# Patient Record
Sex: MALE | Race: WHITE | Employment: FULL TIME | URBAN - METROPOLITAN AREA
[De-identification: names, ages, dates, MRNs, and addresses within clinical notes are randomized per-mention and may not be internally consistent; named-entity substitution may affect disease eponyms.]

---

## 2020-03-19 PROBLEM — M51.26 HNP (HERNIATED NUCLEUS PULPOSUS), LUMBAR: Status: ACTIVE | Noted: 2020-03-19

## 2020-03-20 ENCOUNTER — HOSPITAL ENCOUNTER (OUTPATIENT)
Dept: PREADMISSION TESTING | Age: 60
Discharge: HOME | End: 2020-03-20
Payer: OTHER GOVERNMENT

## 2020-03-20 DIAGNOSIS — Z01.818 PREOPERATIVE EXAMINATION, UNSPECIFIED: ICD-10-CM

## 2020-03-20 LAB
ABO + RH BLD: NORMAL
ALBUMIN SERPL-MCNC: 3.7 G/DL (ref 3.4–5)
ALBUMIN/GLOB SERPL: 1.2 {RATIO} (ref 0.8–1.7)
ALP SERPL-CCNC: 76 U/L (ref 45–117)
ALT SERPL-CCNC: 53 U/L (ref 16–61)
ANION GAP SERPL CALC-SCNC: 2 MMOL/L (ref 3–18)
AST SERPL-CCNC: 22 U/L (ref 10–38)
BILIRUB SERPL-MCNC: 1.5 MG/DL (ref 0.2–1)
BLOOD GROUP ANTIBODIES SERPL: NORMAL
BUN SERPL-MCNC: 23 MG/DL (ref 7–18)
BUN/CREAT SERPL: 21 (ref 12–20)
CALCIUM SERPL-MCNC: 8.8 MG/DL (ref 8.5–10.1)
CHLORIDE SERPL-SCNC: 107 MMOL/L (ref 100–111)
CO2 SERPL-SCNC: 34 MMOL/L (ref 21–32)
CREAT SERPL-MCNC: 1.09 MG/DL (ref 0.6–1.3)
ERYTHROCYTE [DISTWIDTH] IN BLOOD BY AUTOMATED COUNT: 12.1 % (ref 11.6–14.5)
GLOBULIN SER CALC-MCNC: 3 G/DL (ref 2–4)
GLUCOSE SERPL-MCNC: 112 MG/DL (ref 74–99)
HCT VFR BLD AUTO: 46.9 % (ref 36–48)
HGB BLD-MCNC: 15.8 G/DL (ref 13–16)
MCH RBC QN AUTO: 30.2 PG (ref 24–34)
MCHC RBC AUTO-ENTMCNC: 33.7 G/DL (ref 31–37)
MCV RBC AUTO: 89.5 FL (ref 74–97)
PLATELET # BLD AUTO: 190 K/UL (ref 135–420)
PMV BLD AUTO: 9.5 FL (ref 9.2–11.8)
POTASSIUM SERPL-SCNC: 4.1 MMOL/L (ref 3.5–5.5)
PROT SERPL-MCNC: 6.7 G/DL (ref 6.4–8.2)
RBC # BLD AUTO: 5.24 M/UL (ref 4.7–5.5)
SODIUM SERPL-SCNC: 143 MMOL/L (ref 136–145)
SPECIMEN EXP DATE BLD: NORMAL
WBC # BLD AUTO: 9 K/UL (ref 4.6–13.2)

## 2020-03-20 PROCEDURE — 93005 ELECTROCARDIOGRAM TRACING: CPT

## 2020-03-20 PROCEDURE — 85027 COMPLETE CBC AUTOMATED: CPT

## 2020-03-20 PROCEDURE — 80053 COMPREHEN METABOLIC PANEL: CPT

## 2020-03-20 PROCEDURE — 86900 BLOOD TYPING SEROLOGIC ABO: CPT

## 2020-03-20 PROCEDURE — 36415 COLL VENOUS BLD VENIPUNCTURE: CPT

## 2020-03-20 NOTE — H&P (VIEW-ONLY)
Patient Name:  Constantino Jimenez YOB: 1960 Chief Complaint:  Left-sided L4-5 disc herniation, radiculopathy, and weakness. History of Chief Complaint:  Mr. Christiano Mart is a 70-year-old gentleman who is being seen for left-sided L4-5 disc herniation, radiculopathy, and weakness. He has had lower back pain, left-sided hip pain, and left lower extremity pain that goes into the foot. His pain is mainly in the left leg. He has had these symptoms for two months. He says in October 2019 he moved a 1000 pound rock. His symptoms began worse on 02/28/20. He was seen at [facility] and was given steroids. He says he cannot eat his meals sitting up. The pain is constant. He has numbness in the left foot and feels weakness in the left lower extremity. There is no bowel or bladder dysfunction. He does have some problems with his balance. He can only walk around 800 steps a day. He stands up to brush his teeth only. The pain is worse with walking, standing, and sitting. It is better with lying down. He spends 23 and a half hours a day in bed. The patient has had no chiropractic treatment and no physical therapy. He had an epidural steroid injection done on 03/06/20 which gave him only slight relief. He takes hydrocodone three times a day. He had an MRI scan done at [facility] on 03/02/20. Past Medical/Surgical History:   
Disease/Disorder Date Side Surgery Date Side Comment Hypertension Bunionectomy 1993 left Fractured finger repair, ring finger 1980 left Jaw surgery 1986 Allergies:  No known allergies. Ingredient Reaction Medication Name Comment NO KNOWN ALLERGIES Current Medications:   
Medication Directions  
aspirin 81 mg tablet,delayed release   
diazepam 5 mg tablet   
hydrocodone 5 mg-acetaminophen 325 mg tablet   
lisinopril 10 mg tablet   
meloxicam 7.5 mg tablet Narcan 4 mg/actuation nasal spray   
rosuvastatin 5 mg tablet tadalafil 20 mg tablet Toprol XL 25 mg tablet,extended release   
tramadol 50 mg tablet Vitamin C 1,000 mg tablet Vitamin D3 1,000 unit tablet Social History: He works at [employer] as an . SMOKING Status Tobacco Type Units Per Day Yrs Used Never smoker ALCOHOL There is a history of alcohol use. Type: Beer and wine. 2 glasses consumed weekly. Family History:   
Disease Detail Family Member Age Cause of Death Comments No relevant family history Review of Systems:    Pertinent positives include ringing in ears and numbness/tingling. Pertinent negatives include blurred vision, chest pain, chills, cold, discharge of the eyes, dizziness, double vision, fever, headache, hearing loss, heart murmur, itching of the eyes, palpitations, redness of the eyes, rheumatic fever, sore throat/hoarseness, weight change, abdominal pain, anxiety, bipolar disorder, bladder/kidney infection, bloody stool, blood in urine, burning sensation, changes in mood, chronic cough, depression, diarrhea, difficulty breathing, difficulty swallowing, fainting, frequent urinating, fracture/dislocation, gas/bloating, gout, hemorrhoids, incontinence, joint pain, joint stiffness, loss of balance, memory loss, muscle weakness, nausea/vomiting, pain on breathing, painful urination, psoriasis, rash/itching, Raynaud's phenomenon, rheumatoid disease, seizure disorder, shortness of breath, sprain/strain, swelling of feet, tendonitis, varicose veins and wheezing. Vitals: 
Date BP Pulse Temp (F) Resp. (per min.) Height (Total in.) Weight (lbs.) BMI  
03/19/2020     73.00 235.00 31.00 Physical Examination:   
General:  The patient appears healthy. Cardiovascular:  Arterial pulses are normal. 
Skin:  The skin is normal appearing with no contusions or wounds noted. Heart- RRR Lungs-CTA danielle Abdomen- +BS,soft,nontender Musculoskeletal:  The thoracolumbar spine has normal kyphosis, a normal appearance, and no scoliosis. There is full range of motion of the cervical, thoracic, and lumbar spine and of the hips, knees, and ankles. He has positive straight leg raising on the left. Neurological:  He has 4/5 left EHL. There is no weakness in the thoracic, lumbar, or sacral spine. Deep tendon reflexes are present and normal bilaterally. Ankle and knee jerks are normal with no clonus. Radiograph Examination:  Flexion and extension views of the lumbar spine were obtained and interpreted in the office 3/19/20 and reveal mild degenerative disc disease at L5-S1. Review of his MRI scan of the lumbar spine done at [address] [place] [address] 03/02/20  reveals a large left-sided L5-S1 disc herniation. Impression:   Mr. Elayne Bill has a large left-sided L5-S1 disc herniation with left lower extremity radiculopathy. Plan: We discussed treatments for the condition including surgical intervention, the risks, and benefits as well as the different surgical approaches and decision making. We also discussed nonsurgical care for this condition including medications, injections, physical therapy, rehabilitation, activity modification, and brace utilization. At this point, given the neurogenic changes and weakness with left lower extremity pain, having failed conservative care, he would like to proceed with operative intervention. We will plan for left-sided L4-5 microdiscectomy. The risks versus the benefits as well as the alternatives were fully explained to the patient.   Risks include, but are not limited to, paralysis, death, heart attack, stroke, pulmonary embolism, deep vein thrombosis, infection, failure to relieve pain, increase in back or leg pain, reherniation of disc material, need for revision surgery, scarring, spinal fluid leak, bowel or bladder dysfunction, disease transmission, chronic graft site pain, instrumentation failure, pseudarthrosis, and the need for chronic ambulatory assist devices. The patient states full understanding of the risks and benefits and wishes to proceed.

## 2020-03-21 LAB
ATRIAL RATE: 55 BPM
CALCULATED P AXIS, ECG09: 42 DEGREES
CALCULATED R AXIS, ECG10: 59 DEGREES
CALCULATED T AXIS, ECG11: 58 DEGREES
DIAGNOSIS, 93000: NORMAL
P-R INTERVAL, ECG05: 196 MS
Q-T INTERVAL, ECG07: 432 MS
QRS DURATION, ECG06: 102 MS
QTC CALCULATION (BEZET), ECG08: 413 MS
VENTRICULAR RATE, ECG03: 55 BPM

## 2020-03-22 LAB
BACTERIA SPEC CULT: NORMAL
BACTERIA SPEC CULT: NORMAL
SERVICE CMNT-IMP: NORMAL

## 2020-03-24 ENCOUNTER — ANESTHESIA EVENT (OUTPATIENT)
Dept: SURGERY | Age: 60
End: 2020-03-24
Payer: OTHER GOVERNMENT

## 2020-03-25 ENCOUNTER — APPOINTMENT (OUTPATIENT)
Dept: GENERAL RADIOLOGY | Age: 60
End: 2020-03-25
Attending: ORTHOPAEDIC SURGERY
Payer: OTHER GOVERNMENT

## 2020-03-25 ENCOUNTER — ANESTHESIA (OUTPATIENT)
Dept: SURGERY | Age: 60
End: 2020-03-25
Payer: OTHER GOVERNMENT

## 2020-03-25 ENCOUNTER — HOSPITAL ENCOUNTER (OUTPATIENT)
Age: 60
Setting detail: OUTPATIENT SURGERY
Discharge: HOME | End: 2020-03-25
Attending: ORTHOPAEDIC SURGERY | Admitting: ORTHOPAEDIC SURGERY
Payer: OTHER GOVERNMENT

## 2020-03-25 VITALS
WEIGHT: 231.19 LBS | TEMPERATURE: 97.4 F | HEIGHT: 73 IN | BODY MASS INDEX: 30.64 KG/M2 | SYSTOLIC BLOOD PRESSURE: 152 MMHG | DIASTOLIC BLOOD PRESSURE: 89 MMHG | HEART RATE: 61 BPM | OXYGEN SATURATION: 96 % | RESPIRATION RATE: 16 BRPM

## 2020-03-25 DIAGNOSIS — M51.26 HNP (HERNIATED NUCLEUS PULPOSUS), LUMBAR: Primary | ICD-10-CM

## 2020-03-25 PROCEDURE — 77030008477 HC STYL SATN SLP COVD -A: Performed by: ANESTHESIOLOGY

## 2020-03-25 PROCEDURE — 76210000006 HC OR PH I REC 0.5 TO 1 HR: Performed by: ORTHOPAEDIC SURGERY

## 2020-03-25 PROCEDURE — 77030003666 HC NDL SPINAL BD -A: Performed by: ORTHOPAEDIC SURGERY

## 2020-03-25 PROCEDURE — 77030031139 HC SUT VCRL2 J&J -A: Performed by: ORTHOPAEDIC SURGERY

## 2020-03-25 PROCEDURE — 74011250637 HC RX REV CODE- 250/637: Performed by: ANESTHESIOLOGY

## 2020-03-25 PROCEDURE — 76210000020 HC REC RM PH II FIRST 0.5 HR: Performed by: ORTHOPAEDIC SURGERY

## 2020-03-25 PROCEDURE — 74011000250 HC RX REV CODE- 250: Performed by: NURSE ANESTHETIST, CERTIFIED REGISTERED

## 2020-03-25 PROCEDURE — 77030040361 HC SLV COMPR DVT MDII -B: Performed by: ORTHOPAEDIC SURGERY

## 2020-03-25 PROCEDURE — 77030008462 HC STPLR SKN PROX J&J -A: Performed by: ORTHOPAEDIC SURGERY

## 2020-03-25 PROCEDURE — 76060000033 HC ANESTHESIA 1 TO 1.5 HR: Performed by: ORTHOPAEDIC SURGERY

## 2020-03-25 PROCEDURE — 74011000272 HC RX REV CODE- 272: Performed by: ORTHOPAEDIC SURGERY

## 2020-03-25 PROCEDURE — 77030027521 HC SEAL BPLR AQMNTYS EVS MEDT -F: Performed by: ORTHOPAEDIC SURGERY

## 2020-03-25 PROCEDURE — 74011250636 HC RX REV CODE- 250/636: Performed by: ORTHOPAEDIC SURGERY

## 2020-03-25 PROCEDURE — 77030018836 HC SOL IRR NACL ICUM -A: Performed by: ORTHOPAEDIC SURGERY

## 2020-03-25 PROCEDURE — 77030003029 HC SUT VCRL J&J -B: Performed by: ORTHOPAEDIC SURGERY

## 2020-03-25 PROCEDURE — 74011000250 HC RX REV CODE- 250: Performed by: ORTHOPAEDIC SURGERY

## 2020-03-25 PROCEDURE — 74011250636 HC RX REV CODE- 250/636: Performed by: ANESTHESIOLOGY

## 2020-03-25 PROCEDURE — 77030018673: Performed by: ORTHOPAEDIC SURGERY

## 2020-03-25 PROCEDURE — 77030008683 HC TU ET CUF COVD -A: Performed by: ANESTHESIOLOGY

## 2020-03-25 PROCEDURE — 76010000149 HC OR TIME 1 TO 1.5 HR: Performed by: ORTHOPAEDIC SURGERY

## 2020-03-25 PROCEDURE — 77030010507 HC ADH SKN DERMBND J&J -B: Performed by: ORTHOPAEDIC SURGERY

## 2020-03-25 PROCEDURE — 77030020782 HC GWN BAIR PAWS FLX 3M -B: Performed by: ORTHOPAEDIC SURGERY

## 2020-03-25 PROCEDURE — 74011250636 HC RX REV CODE- 250/636: Performed by: NURSE ANESTHETIST, CERTIFIED REGISTERED

## 2020-03-25 PROCEDURE — 77030006643: Performed by: ANESTHESIOLOGY

## 2020-03-25 PROCEDURE — 77030002986 HC SUT PROL J&J -A: Performed by: ORTHOPAEDIC SURGERY

## 2020-03-25 RX ORDER — SODIUM CHLORIDE, SODIUM LACTATE, POTASSIUM CHLORIDE, CALCIUM CHLORIDE 600; 310; 30; 20 MG/100ML; MG/100ML; MG/100ML; MG/100ML
125 INJECTION, SOLUTION INTRAVENOUS CONTINUOUS
Status: DISCONTINUED | OUTPATIENT
Start: 2020-03-25 | End: 2020-03-25 | Stop reason: HOSPADM

## 2020-03-25 RX ORDER — FENTANYL CITRATE 50 UG/ML
25 INJECTION, SOLUTION INTRAMUSCULAR; INTRAVENOUS
Status: DISCONTINUED | OUTPATIENT
Start: 2020-03-25 | End: 2020-03-25 | Stop reason: HOSPADM

## 2020-03-25 RX ORDER — CYCLOBENZAPRINE HCL 10 MG
5-10 TABLET ORAL
Qty: 60 TAB | Refills: 0 | Status: SHIPPED | OUTPATIENT
Start: 2020-03-25 | End: 2020-04-14

## 2020-03-25 RX ORDER — ROCURONIUM BROMIDE 10 MG/ML
INJECTION, SOLUTION INTRAVENOUS AS NEEDED
Status: DISCONTINUED | OUTPATIENT
Start: 2020-03-25 | End: 2020-03-25 | Stop reason: HOSPADM

## 2020-03-25 RX ORDER — MIDAZOLAM HYDROCHLORIDE 1 MG/ML
INJECTION, SOLUTION INTRAMUSCULAR; INTRAVENOUS AS NEEDED
Status: DISCONTINUED | OUTPATIENT
Start: 2020-03-25 | End: 2020-03-25 | Stop reason: HOSPADM

## 2020-03-25 RX ORDER — ONDANSETRON 2 MG/ML
4 INJECTION INTRAMUSCULAR; INTRAVENOUS ONCE
Status: DISCONTINUED | OUTPATIENT
Start: 2020-03-25 | End: 2020-03-25 | Stop reason: HOSPADM

## 2020-03-25 RX ORDER — FENTANYL CITRATE 50 UG/ML
INJECTION, SOLUTION INTRAMUSCULAR; INTRAVENOUS AS NEEDED
Status: DISCONTINUED | OUTPATIENT
Start: 2020-03-25 | End: 2020-03-25 | Stop reason: HOSPADM

## 2020-03-25 RX ORDER — SODIUM CHLORIDE, SODIUM LACTATE, POTASSIUM CHLORIDE, CALCIUM CHLORIDE 600; 310; 30; 20 MG/100ML; MG/100ML; MG/100ML; MG/100ML
150 INJECTION, SOLUTION INTRAVENOUS CONTINUOUS
Status: DISCONTINUED | OUTPATIENT
Start: 2020-03-25 | End: 2020-03-25 | Stop reason: HOSPADM

## 2020-03-25 RX ORDER — DEXTROSE MONOHYDRATE 100 MG/ML
125-250 INJECTION, SOLUTION INTRAVENOUS AS NEEDED
Status: DISCONTINUED | OUTPATIENT
Start: 2020-03-25 | End: 2020-03-25 | Stop reason: HOSPADM

## 2020-03-25 RX ORDER — ONDANSETRON 2 MG/ML
INJECTION INTRAMUSCULAR; INTRAVENOUS AS NEEDED
Status: DISCONTINUED | OUTPATIENT
Start: 2020-03-25 | End: 2020-03-25 | Stop reason: HOSPADM

## 2020-03-25 RX ORDER — NEOSTIGMINE METHYLSULFATE 1 MG/ML
INJECTION, SOLUTION INTRAVENOUS AS NEEDED
Status: DISCONTINUED | OUTPATIENT
Start: 2020-03-25 | End: 2020-03-25 | Stop reason: HOSPADM

## 2020-03-25 RX ORDER — MAGNESIUM SULFATE 100 %
4 CRYSTALS MISCELLANEOUS AS NEEDED
Status: DISCONTINUED | OUTPATIENT
Start: 2020-03-25 | End: 2020-03-25 | Stop reason: HOSPADM

## 2020-03-25 RX ORDER — SODIUM CHLORIDE 0.9 % (FLUSH) 0.9 %
5-40 SYRINGE (ML) INJECTION AS NEEDED
Status: DISCONTINUED | OUTPATIENT
Start: 2020-03-25 | End: 2020-03-25 | Stop reason: HOSPADM

## 2020-03-25 RX ORDER — LIDOCAINE HYDROCHLORIDE 20 MG/ML
INJECTION, SOLUTION EPIDURAL; INFILTRATION; INTRACAUDAL; PERINEURAL AS NEEDED
Status: DISCONTINUED | OUTPATIENT
Start: 2020-03-25 | End: 2020-03-25 | Stop reason: HOSPADM

## 2020-03-25 RX ORDER — HYDROCODONE BITARTRATE AND ACETAMINOPHEN 5; 325 MG/1; MG/1
1 TABLET ORAL AS NEEDED
Status: DISCONTINUED | OUTPATIENT
Start: 2020-03-25 | End: 2020-03-25 | Stop reason: HOSPADM

## 2020-03-25 RX ORDER — SODIUM CHLORIDE 0.9 % (FLUSH) 0.9 %
5-40 SYRINGE (ML) INJECTION EVERY 8 HOURS
Status: DISCONTINUED | OUTPATIENT
Start: 2020-03-25 | End: 2020-03-25 | Stop reason: HOSPADM

## 2020-03-25 RX ORDER — BUPIVACAINE HYDROCHLORIDE AND EPINEPHRINE 2.5; 5 MG/ML; UG/ML
INJECTION, SOLUTION EPIDURAL; INFILTRATION; INTRACAUDAL; PERINEURAL AS NEEDED
Status: DISCONTINUED | OUTPATIENT
Start: 2020-03-25 | End: 2020-03-25 | Stop reason: HOSPADM

## 2020-03-25 RX ORDER — ALBUTEROL SULFATE 0.83 MG/ML
2.5 SOLUTION RESPIRATORY (INHALATION) AS NEEDED
Status: DISCONTINUED | OUTPATIENT
Start: 2020-03-25 | End: 2020-03-25 | Stop reason: HOSPADM

## 2020-03-25 RX ORDER — INSULIN LISPRO 100 [IU]/ML
INJECTION, SOLUTION INTRAVENOUS; SUBCUTANEOUS ONCE
Status: DISCONTINUED | OUTPATIENT
Start: 2020-03-25 | End: 2020-03-25 | Stop reason: HOSPADM

## 2020-03-25 RX ORDER — PROPOFOL 10 MG/ML
INJECTION, EMULSION INTRAVENOUS AS NEEDED
Status: DISCONTINUED | OUTPATIENT
Start: 2020-03-25 | End: 2020-03-25 | Stop reason: HOSPADM

## 2020-03-25 RX ORDER — NALOXONE HYDROCHLORIDE 4 MG/.1ML
SPRAY NASAL
Qty: 1 EACH | Refills: 0 | Status: SHIPPED | OUTPATIENT
Start: 2020-03-25

## 2020-03-25 RX ORDER — CEFAZOLIN SODIUM 2 G/50ML
2 SOLUTION INTRAVENOUS ONCE
Status: COMPLETED | OUTPATIENT
Start: 2020-03-25 | End: 2020-03-25

## 2020-03-25 RX ORDER — FENTANYL CITRATE 50 UG/ML
INJECTION, SOLUTION INTRAMUSCULAR; INTRAVENOUS
Status: DISCONTINUED
Start: 2020-03-25 | End: 2020-03-25 | Stop reason: HOSPADM

## 2020-03-25 RX ORDER — NALOXONE HYDROCHLORIDE 0.4 MG/ML
0.1 INJECTION, SOLUTION INTRAMUSCULAR; INTRAVENOUS; SUBCUTANEOUS AS NEEDED
Status: DISCONTINUED | OUTPATIENT
Start: 2020-03-25 | End: 2020-03-25 | Stop reason: HOSPADM

## 2020-03-25 RX ORDER — GLYCOPYRROLATE 0.2 MG/ML
INJECTION INTRAMUSCULAR; INTRAVENOUS AS NEEDED
Status: DISCONTINUED | OUTPATIENT
Start: 2020-03-25 | End: 2020-03-25 | Stop reason: HOSPADM

## 2020-03-25 RX ORDER — HYDROMORPHONE HYDROCHLORIDE 2 MG/ML
0.2 INJECTION, SOLUTION INTRAMUSCULAR; INTRAVENOUS; SUBCUTANEOUS AS NEEDED
Status: DISCONTINUED | OUTPATIENT
Start: 2020-03-25 | End: 2020-03-25 | Stop reason: HOSPADM

## 2020-03-25 RX ORDER — OXYCODONE AND ACETAMINOPHEN 5; 325 MG/1; MG/1
1-2 TABLET ORAL
Qty: 84 TAB | Refills: 0 | Status: SHIPPED | OUTPATIENT
Start: 2020-03-25 | End: 2020-04-01

## 2020-03-25 RX ORDER — DIPHENHYDRAMINE HYDROCHLORIDE 50 MG/ML
12.5 INJECTION, SOLUTION INTRAMUSCULAR; INTRAVENOUS
Status: DISCONTINUED | OUTPATIENT
Start: 2020-03-25 | End: 2020-03-25 | Stop reason: HOSPADM

## 2020-03-25 RX ADMIN — Medication 3 MG: at 12:41

## 2020-03-25 RX ADMIN — SODIUM CHLORIDE, SODIUM LACTATE, POTASSIUM CHLORIDE, AND CALCIUM CHLORIDE: 600; 310; 30; 20 INJECTION, SOLUTION INTRAVENOUS at 12:07

## 2020-03-25 RX ADMIN — GLYCOPYRROLATE 0.2 MG: 0.2 INJECTION INTRAMUSCULAR; INTRAVENOUS at 11:40

## 2020-03-25 RX ADMIN — FENTANYL CITRATE 100 MCG: 50 INJECTION, SOLUTION INTRAMUSCULAR; INTRAVENOUS at 11:44

## 2020-03-25 RX ADMIN — FENTANYL CITRATE 25 MCG: 50 INJECTION INTRAMUSCULAR; INTRAVENOUS at 13:41

## 2020-03-25 RX ADMIN — GLYCOPYRROLATE 0.4 MG: 0.2 INJECTION INTRAMUSCULAR; INTRAVENOUS at 12:41

## 2020-03-25 RX ADMIN — SODIUM CHLORIDE, SODIUM LACTATE, POTASSIUM CHLORIDE, AND CALCIUM CHLORIDE 125 ML/HR: 600; 310; 30; 20 INJECTION, SOLUTION INTRAVENOUS at 09:20

## 2020-03-25 RX ADMIN — MIDAZOLAM 2 MG: 1 INJECTION INTRAMUSCULAR; INTRAVENOUS at 11:37

## 2020-03-25 RX ADMIN — HYDROCODONE BITARTRATE AND ACETAMINOPHEN 1 TABLET: 5; 325 TABLET ORAL at 14:18

## 2020-03-25 RX ADMIN — FENTANYL CITRATE 25 MCG: 50 INJECTION INTRAMUSCULAR; INTRAVENOUS at 13:31

## 2020-03-25 RX ADMIN — FENTANYL CITRATE 25 MCG: 50 INJECTION INTRAMUSCULAR; INTRAVENOUS at 13:51

## 2020-03-25 RX ADMIN — CEFAZOLIN SODIUM 2 G: 2 SOLUTION INTRAVENOUS at 11:54

## 2020-03-25 RX ADMIN — FENTANYL CITRATE 25 MCG: 50 INJECTION INTRAMUSCULAR; INTRAVENOUS at 14:01

## 2020-03-25 RX ADMIN — Medication 50 MG: at 11:45

## 2020-03-25 RX ADMIN — LIDOCAINE HYDROCHLORIDE 100 MG: 20 INJECTION, SOLUTION EPIDURAL; INFILTRATION; INTRACAUDAL; PERINEURAL at 11:44

## 2020-03-25 RX ADMIN — PROPOFOL 180 MG: 10 INJECTION, EMULSION INTRAVENOUS at 11:44

## 2020-03-25 RX ADMIN — ONDANSETRON HYDROCHLORIDE 4 MG: 2 INJECTION INTRAMUSCULAR; INTRAVENOUS at 11:40

## 2020-03-25 RX ADMIN — FENTANYL CITRATE 50 MCG: 50 INJECTION, SOLUTION INTRAMUSCULAR; INTRAVENOUS at 12:29

## 2020-03-25 NOTE — ANESTHESIA POSTPROCEDURE EVALUATION
Post-Anesthesia Evaluation and Assessment    Cardiovascular Function/Vital Signs  Visit Vitals  /81   Pulse (!) 59   Temp 36.3 °C (97.4 °F)   Resp 14   Ht 6' 1\" (1.854 m)   Wt 104.9 kg (231 lb 3 oz)   SpO2 99%   BMI 30.50 kg/m²       Patient is status post Procedure(s):  LEFT LUMBAR 4-5 MICRODISKECTOMY WITH C-ARM \"SPEC POP\". Nausea/Vomiting: Controlled. Postoperative hydration reviewed and adequate. Pain:  Pain Scale 1: Numeric (0 - 10) (03/25/20 1358)  Pain Intensity 1: 4 (03/25/20 1358)   Managed. Neurological Status:   Neuro (WDL): Exceptions to WDL (03/25/20 1337)   At baseline. Mental Status and Level of Consciousness: Arousable. Pulmonary Status:   O2 Device: Room air (03/25/20 1358)   Adequate oxygenation and airway patent. Complications related to anesthesia: None    Post-anesthesia assessment completed. No concerns. Patient has met all discharge requirements. Signed By: Brayden Barr CRNA    March 25, 2020           Post-Anesthesia Evaluation and Assessment    Cardiovascular Function/Vital Signs  Visit Vitals  /81   Pulse (!) 59   Temp 36.3 °C (97.4 °F)   Resp 14   Ht 6' 1\" (1.854 m)   Wt 104.9 kg (231 lb 3 oz)   SpO2 99%   BMI 30.50 kg/m²       Patient is status post Procedure(s):  LEFT LUMBAR 4-5 MICRODISKECTOMY WITH C-ARM \"SPEC POP\". Nausea/Vomiting: Controlled. Postoperative hydration reviewed and adequate. Pain:  Pain Scale 1: Numeric (0 - 10) (03/25/20 1358)  Pain Intensity 1: 4 (03/25/20 1358)   Managed. Neurological Status:   Neuro (WDL): Exceptions to WDL (03/25/20 1337)   At baseline. Mental Status and Level of Consciousness: Arousable. Pulmonary Status:   O2 Device: Room air (03/25/20 1358)   Adequate oxygenation and airway patent. Complications related to anesthesia: None    Post-anesthesia assessment completed. No concerns. Patient has met all discharge requirements.     Signed By: Martha Dao MD    March 25, 2020 Procedure(s):  LEFT LUMBAR 4-5 MICRODISKECTOMY WITH C-ARM \"SPEC POP\". general    <BSHSIANPOST>    Vitals Value Taken Time   /79 3/25/2020  2:00 PM   Temp 36.3 °C (97.4 °F) 3/25/2020  1:10 PM   Pulse 59 3/25/2020  2:04 PM   Resp 16 3/25/2020  2:04 PM   SpO2 98 % 3/25/2020  2:04 PM   Vitals shown include unvalidated device data.

## 2020-03-25 NOTE — DISCHARGE INSTRUCTIONS
"  SUBJECTIVE:                                                    Kathie Hidalgo is a 27 year old female who presents to clinic today for the following health issues:        Acute Illness   Acute illness concerns: Sore throat and Ear pain  Onset: 1 day    Fever: no    Chills/Sweats: no    Headache (location?): YES- 3 weeks    Sinus Pressure:YES- 3 weeks    Conjunctivitis:  no    Ear Pain: YES: left    Rhinorrhea: no    Congestion: YES- 2 months    Sore Throat: YES, just tthe left side     Cough: YES-X4 weeks    Wheeze: no    Decreased Appetite: no    Nausea: no    Vomiting: no    Diarrhea:  no    Dysuria/Freq.: no    Fatigue/Achiness: YES    Sick/Strep Exposure: YES-  with similar sx     Therapies Tried and outcome: NA  No insurance - self pay and doesn't want testing today    Problem list and histories reviewed & adjusted, as indicated.  Additional history: as documented    Patient Active Problem List   Diagnosis     Attempting to conceive     Missed ab     Past Surgical History:   Procedure Laterality Date     APPENDECTOMY       FOOT SURGERY         Social History   Substance Use Topics     Smoking status: Never Smoker     Smokeless tobacco: Never Used     Alcohol use Yes      Comment: minimal     Family History   Problem Relation Age of Onset     Other Cancer Father          Current Outpatient Prescriptions   Medication Sig Dispense Refill     amoxicillin (AMOXIL) 875 MG tablet Take 1 tablet (875 mg) by mouth 2 times daily 20 tablet 0     No Known Allergies    ROS:  Constitutional, HEENT, cardiovascular, pulmonary, gi and gu systems are negative, except as otherwise noted.    OBJECTIVE:     /57 (BP Location: Right arm, Patient Position: Chair, Cuff Size: Adult Large)  Pulse 81  Temp 98.5  F (36.9  C) (Oral)  Resp 18  Ht 5' 5.25\" (1.657 m)  Wt 187 lb (84.8 kg)  LMP 02/01/2018  SpO2 98%  BMI 30.88 kg/m2  Body mass index is 30.88 kg/(m^2).  GENERAL: healthy, alert and mild distress  EYES: Eyes " WBAT. Change dressing in 3 days. Do not get incision wet x 5 days. No lifting over 20 pounds. Take stool softeners daily while taking pain medications, since pain medicines are constipating. Resume pre hospital diet  Drink plenty of fluids  Take prescription as directed  Ambulate in house  Follow up with Dr. Edwar Vásquez in 10 days or as scheduled        DISCHARGE SUMMARY from Nurse    PATIENT INSTRUCTIONS:    After general anesthesia or intravenous sedation, for 24 hours or while taking prescription Narcotics:  · Limit your activities  · Do not drive and operate hazardous machinery  · Do not make important personal or business decisions  · Do  not drink alcoholic beverages  · If you have not urinated within 8 hours after discharge, please contact your surgeon on call. Report the following to your surgeon:  · Excessive pain, swelling, redness or odor of or around the surgical area  · Temperature over 100.5  · Nausea and vomiting lasting longer than 4 hours or if unable to take medications  · Any signs of decreased circulation or nerve impairment to extremity: change in color, persistent  numbness, tingling, coldness or increase pain  · Any questions    What to do at Home:  Recommended activity: Ambulate in house, No driving while on analgesics and No heavy lifting until advised    If you experience any of the following symptoms fever, chills, uncontrollable pain , active bleeding or drainage , circulation changes ( cold,numb,blue extremity), nausea, vomiting, difficulty urinating, please follow up with Dr. Edwar Vásquez. *  Please give a list of your current medications to your Primary Care Provider. *  Please update this list whenever your medications are discontinued, doses are      changed, or new medications (including over-the-counter products) are added. *  Please carry medication information at all times in case of emergency situations.     These are general instructions for a healthy grossly normal to inspection, PERRL and conjunctivae and sclerae normal  HENT: ear canals and right TM's normal, left TM injected, nose and mouth without ulcers or lesions, post pharynx inflamed but not edematous, no sinus tenderness   NECK: no tender adenopathy, no asymmetry, masses, or scars and thyroid normal to palpation  RESP: lungs clear to auscultation - no rales, rhonchi or wheezes  CV: regular rate and rhythm, normal S1 S2, no S3 or S4, no murmur, click or rub,    Diagnostic Test Results:  none     ASSESSMENT/PLAN:         ICD-10-CM    1. Acute sinusitis, recurrence not specified, unspecified location J01.90 amoxicillin (AMOXIL) 875 MG tablet   due to duration of illness will treat for bacterial sinusitis     Patient Instructions   Begin taking plain mucinex 600mg twice a day  And use flonase 2 sprays each nostril once a day  Take aleve for pain   Take the antibiotic twice a day for 10 days       ELOISE Meraz CNP  [facility]       lifestyle:    No smoking/ No tobacco products/ Avoid exposure to second hand smoke  Surgeon General's Warning:  Quitting smoking now greatly reduces serious risk to your health. Obesity, smoking, and sedentary lifestyle greatly increases your risk for illness    A healthy diet, regular physical exercise & weight monitoring are important for maintaining a healthy lifestyle    You may be retaining fluid if you have a history of heart failure or if you experience any of the following symptoms:  Weight gain of 3 pounds or more overnight or 5 pounds in a week, increased swelling in our hands or feet or shortness of breath while lying flat in bed. Please call your doctor as soon as you notice any of these symptoms; do not wait until your next office visit. Patient armband removed and shredded    The discharge information has been reviewed with the patient and caregiver. The patient and caregiver verbalized understanding. Discharge medications reviewed with the patient and caregiver and appropriate educational materials and side effects teaching were provided. Patient Education        Learning About Coronavirus Disease (COVID-19)  Coronavirus disease (COVID-19): Overview  What is coronavirus disease (COVID-19)? The coronavirus disease 2019 (COVID-19) is caused by a virus. It is an illness that was first found in [place], [place], in December 2019. It has since spread to other countries. The virus can cause fever, cough, and trouble breathing. In severe cases, it can cause pneumonia and make it hard to breathe without help. It can cause death. Coronaviruses are a large group of viruses. They cause the common cold. They also cause more serious illnesses like Middle East respiratory syndrome (MERS) and severe acute respiratory syndrome (SARS). COVID-19 is caused by a novel coronavirus. That means it's a new type that has not been seen in people before.   This virus spreads person-to-person through droplets from coughing and sneezing. It may also spread by touching something that has the virus on it, such as a doorknob or a tabletop. What can you do to protect yourself from coronavirus disease (COVID-19)? The best way to protect yourself from getting sick is to:  · Avoid areas where there is an outbreak. · Avoid contact with people who may be infected. · Wash your hands often with soap or alcohol-based hand sanitizers. · Avoid touching your mouth, nose, and eyes with unwashed hands. What can you do to avoid spreading the virus to others? To help avoid spreading the virus to others:  · Cover your mouth with a tissue when you cough or sneeze. Then throw the tissue in the trash. · Use a disinfectant to clean things that you touch often. · Stay home if you are sick. If you must go to the doctor's office, call ahead for instructions and wear a face mask. When to call for help  Call 911 anytime you think you may need emergency care. For example, call if:  · You have severe trouble breathing. · You have severe dehydration. Symptoms of dehydration include:  ? Dry eyes and a dry mouth. ? Passing only a little urine. ? Feeling thirstier than usual.  · You are extremely confused or not thinking clearly. · You pass out (lose consciousness). Call your doctor now if you develop symptoms such as:  · Shortness of breath. · Fever. · Cough. If you need to get care, call ahead to the doctor's office for instructions before you go. Make sure you wear a face mask when you go there to prevent exposing other people to the virus. Where can you get the latest information? The following health organizations are tracking and studying this virus. Their websites contain the most up-to-date information. Verna Habermann also learn what to do if you think you may have been exposed to the virus. · U.S. Centers for Disease Control and Prevention (CDC): The CDC provides updated news about the disease.  The website also tells you how to prevent the spread of infection. www.cdc.gov  · World Health Organization [facility]): WHO offers information about the virus outbreaks. WHO also has travel advice. www.who.int  Current as of: March 12, 2020               Content Version: 12.4  © 2006-2020 [employer]. Care instructions adapted under license by your healthcare professional. If you have questions about a medical condition or this instruction, always ask your healthcare professional. [address] any warranty or liability for your use of this information.          ___________________________________________________________________________________________________________________________________

## 2020-03-25 NOTE — PERIOP NOTES
Reviewed PTA medication list with patient/caregiver and patient/caregiver denies any additional medications. Patient admits to having a responsible adult care for them at home for at least 24 hours after surgery. Patient encouraged to use gown warming system and informed that using said warming gown to regulate body temperature prior to a procedure has been shown to help reduce the risks of blood clots and infection.     Dual skin assessment & fall risk band verification completed with Saul Amin RN.

## 2020-03-25 NOTE — OP NOTES
Patient: Jeronimo Bell MRN: [id number]  SSN: [id number]    YOB: 1960  Age: 61 y.o. Sex: male      Date of Procedure: 3/25/2020   Preoperative Diagnosis: LUMBAGO, LUMBAR RADICULOPATHY, HYPERTENSION  Postoperative Diagnosis: LUMBAGO, LUMBAR RADICULOPATHY, HYPERTENSION    Procedure: Procedure(s):  LEFT LUMBAR 4-5 MICRODISKECTOMY WITH C-ARM \"SPEC POP\"  Surgeon(s) and Role:     * Yelitza Colunga MD - Primary  Assistant: Tejas Miller PA-C  OR Assitance: Physician Assistant: LISETH Kaiser  Anesthesia: General   Estimated Blood Loss: 50cc  Specimens: * No specimens in log *   Findings: HNP   Complications: none      Indications: This is a 61y.o. year-old male who presents with significant left lower extremity pain, worsening ability to do activities of daily living. X-rays and MRI scan revealing disc herniation and foraminal stenosis, and degenerative disk disease. Having having failed conservative care now comes for operative intervention. DESCRIPTION OF PROCEDURE: After correct identification, the patient was   taken to the operating room, placed supine on the table, general   endotracheal anesthesia induced. The patient was then rotated onto the Ernst frame and prepped with DuraPrep. 2grams of Ancef was given. Midline incision was made in the lumbar spine, taken down to the spinous processes of L4-5. The posterior lamina of L4-5 was exposed. Noa retractor was then placed. The wound was then irrigated. Laminectomy was then performed of the inferior aspect of L4 as well as the superior aspect of L5. This provided excellent visualization of the dura. The nerve root was then mobilized medially and held with a nerve root retractor. The disc herniation was noted be very large, extending into the axilla of the nerve root and compressing the nerve root.  #15 blade was used to make a cruciate incision in the annulus and large pieces of disc material were removed from behind the annulus, from the axilla, as well as from the disc space itself. Bleeding controlled with bipolar electrocautery. The wound was then irrigated. Fascia was then closed using #1 Vicryl suture. Subcutaneous tissue   approximated with 2-0 Vicryl suture. Skin approximated with 3-0 PDS suture and dermabond was appled. Sterile dressing was applied. The patient tolerated the procedure well and taken to Recovery room in good   condition. Assistant surgeon was needed throughout the procedure for managing bleeding, improving visualization and closure of the surgical wounds.

## 2020-03-25 NOTE — INTERVAL H&P NOTE
Update History & Physical 
 
The Patient's History and Physical  was reviewed with the patient and I examined the patient. There was no change. The surgical site was confirmed by the patient and me. Plan:  The risk, benefits, expected outcome, and alternative to the recommended procedure have been discussed with the patient. Patient understands and wants to proceed with the procedure.  
 
Electronically signed by Asiya Sands MD on 3/25/2020 at 8:23 AM

## 2020-03-25 NOTE — ANESTHESIA PREPROCEDURE EVALUATION
Relevant Problems   No relevant active problems       Anesthetic History   No history of anesthetic complications            Review of Systems / Medical History  Patient summary reviewed, nursing notes reviewed and pertinent labs reviewed    Pulmonary  Within defined limits                 Neuro/Psych   Within defined limits           Cardiovascular    Hypertension              Exercise tolerance: >4 METS     GI/Hepatic/Renal  Within defined limits              Endo/Other  Within defined limits           Other Findings              Physical Exam    Airway  Mallampati: II  TM Distance: 4 - 6 cm  Neck ROM: normal range of motion   Mouth opening: Normal     Cardiovascular  Regular rate and rhythm,  S1 and S2 normal,  no murmur, click, rub, or gallop             Dental  No notable dental hx       Pulmonary  Breath sounds clear to auscultation               Abdominal  GI exam deferred       Other Findings            Anesthetic Plan    ASA: 2  Anesthesia type: general          Induction: Intravenous  Anesthetic plan and risks discussed with: Patient

## 2021-01-26 ENCOUNTER — HOSPITAL ENCOUNTER (OUTPATIENT)
Dept: PREADMISSION TESTING | Age: 61
Discharge: HOME | End: 2021-01-26
Payer: OTHER GOVERNMENT

## 2021-01-26 LAB
ALBUMIN SERPL-MCNC: 4 G/DL (ref 3.4–5)
ALBUMIN/GLOB SERPL: 1.3 {RATIO} (ref 0.8–1.7)
ALP SERPL-CCNC: 82 U/L (ref 45–117)
ALT SERPL-CCNC: 57 U/L (ref 16–61)
ANION GAP SERPL CALC-SCNC: 1 MMOL/L (ref 3–18)
AST SERPL-CCNC: 31 U/L (ref 10–38)
ATRIAL RATE: 57 BPM
BILIRUB SERPL-MCNC: 1.1 MG/DL (ref 0.2–1)
BUN SERPL-MCNC: 22 MG/DL (ref 7–18)
BUN/CREAT SERPL: 18 (ref 12–20)
CALCIUM SERPL-MCNC: 9.1 MG/DL (ref 8.5–10.1)
CALCULATED P AXIS, ECG09: 34 DEGREES
CALCULATED R AXIS, ECG10: 31 DEGREES
CALCULATED T AXIS, ECG11: 39 DEGREES
CHLORIDE SERPL-SCNC: 109 MMOL/L (ref 100–111)
CO2 SERPL-SCNC: 32 MMOL/L (ref 21–32)
CREAT SERPL-MCNC: 1.21 MG/DL (ref 0.6–1.3)
DIAGNOSIS, 93000: NORMAL
ERYTHROCYTE [DISTWIDTH] IN BLOOD BY AUTOMATED COUNT: 12.2 % (ref 11.6–14.5)
GLOBULIN SER CALC-MCNC: 3.1 G/DL (ref 2–4)
GLUCOSE SERPL-MCNC: 87 MG/DL (ref 74–99)
HCT VFR BLD AUTO: 46.6 % (ref 36–48)
HGB BLD-MCNC: 15.5 G/DL (ref 13–16)
MCH RBC QN AUTO: 29.6 PG (ref 24–34)
MCHC RBC AUTO-ENTMCNC: 33.3 G/DL (ref 31–37)
MCV RBC AUTO: 88.9 FL (ref 74–97)
P-R INTERVAL, ECG05: 202 MS
PLATELET # BLD AUTO: 209 K/UL (ref 135–420)
PMV BLD AUTO: 9.1 FL (ref 9.2–11.8)
POTASSIUM SERPL-SCNC: 4.8 MMOL/L (ref 3.5–5.5)
PROT SERPL-MCNC: 7.1 G/DL (ref 6.4–8.2)
Q-T INTERVAL, ECG07: 454 MS
QRS DURATION, ECG06: 102 MS
QTC CALCULATION (BEZET), ECG08: 441 MS
RBC # BLD AUTO: 5.24 M/UL (ref 4.7–5.5)
SODIUM SERPL-SCNC: 142 MMOL/L (ref 136–145)
VENTRICULAR RATE, ECG03: 57 BPM
WBC # BLD AUTO: 10 K/UL (ref 4.6–13.2)

## 2021-01-26 PROCEDURE — 85027 COMPLETE CBC AUTOMATED: CPT

## 2021-01-26 PROCEDURE — 93005 ELECTROCARDIOGRAM TRACING: CPT

## 2021-01-26 PROCEDURE — 36415 COLL VENOUS BLD VENIPUNCTURE: CPT

## 2021-01-26 PROCEDURE — 80053 COMPREHEN METABOLIC PANEL: CPT

## 2021-02-03 ENCOUNTER — HOSPITAL ENCOUNTER (OUTPATIENT)
Dept: PREADMISSION TESTING | Age: 61
Discharge: HOME | End: 2021-02-03
Payer: OTHER GOVERNMENT

## 2021-02-03 PROCEDURE — U0003 INFECTIOUS AGENT DETECTION BY NUCLEIC ACID (DNA OR RNA); SEVERE ACUTE RESPIRATORY SYNDROME CORONAVIRUS 2 (SARS-COV-2) (CORONAVIRUS DISEASE [COVID-19]), AMPLIFIED PROBE TECHNIQUE, MAKING USE OF HIGH THROUGHPUT TECHNOLOGIES AS DESCRIBED BY CMS-2020-01-R: HCPCS

## 2021-02-04 LAB — SARS-COV-2, COV2NT: NOT DETECTED

## 2021-02-08 ENCOUNTER — ANESTHESIA EVENT (OUTPATIENT)
Dept: SURGERY | Age: 61
End: 2021-02-08
Payer: OTHER GOVERNMENT

## 2021-02-08 NOTE — H&P
Patient Name:  Uzair Quintana  Account #:  [de-identified]  YOB: 1960    Chief Complaint:  Followup left foot. History of Chief Complaint:  Mr. Cali Johnson follows up. He has upcoming left-sided foot surgery in February. He is here for preoperative evaluation to make sure nothing has changed. He does note he has a new rash on his right hip for which he is being treated with a topical cream.  He is wearing his modified shoes. His foot still hurts. He still has some numbness from his back surgery from March of 2020. The left foot still hurts mostly at the 2nd and 3rd toe. Past Medical/Surgical History:    Disease/Disorder Date Side Surgery Date Side Comment   Hypertension            Bunionectomy 1993 left       Discectomy, lumbar 03/25/2020  DL 04/08/2020 -L4-5      Fractured finger repair, ring finger 1980 left       Jaw surgery 1986       Allergies:  No known allergies. Ingredient Reaction Medication Name Comment   NO KNOWN ALLERGIES        Current Medications:    Medication Directions   aspirin 81 mg tablet,delayed release    lisinopril 10 mg tablet    Percocet 5 mg-325 mg tablet take 1 tablet by oral route  every 4 hours as needed   rosuvastatin 5 mg tablet    tadalafil 20 mg tablet    Toprol XL 25 mg tablet,extended release    Vitamin C 1,000 mg tablet    Vitamin D3 1,000 unit tablet      Social History:    SMOKING  Status Tobacco Type Units Per Day Yrs Used   Never smoker        ALCOHOL  There is a history of alcohol use. Type: Beer and wine. 2 glasses consumed weekly. Family History:    Disease Detail Family Member Age Cause of Death Comments   No relevant family history         Review of Systems:    GENERAL:  Patient has no signs of fever, chills or weight change.   HEAD/ENTM:  Patient has no signs of headaches, dizziness, hearing loss, ringing in ears, sore throat/hoarseness, recent cold, double vision, blurred vision, itchy eyes, eye redness or eye discharge. CARDIOVASCULAR:  Patient has no signs of chest pain, palpitations, rheumatic fever or heart murmur. RESPIRATORY:  Patient has no signs of chronic cough, wheezing, difficulty breathing, pain on breathing or shortness of breath. GASTROINTESTINAL:  Patient has no signs of nausea/vomiting, difficulty swallowing, gas/bloating, indigestion, abdominal pain, diarrhea, bloody stools or hemorrhoids. GENITOURINARY:  Patient has no signs of blood in urine, painful urinating, burning sensation, bladder/kidney infection, frequent urinating or incontinence. MUSCULOSKELETAL: Patient presents with joint pain. Patient has no signs of fracture/dislocation, sprain/strain, tendonitis, joint stiffness, rheumatoid disease, gout or swelling in feet. INTEGUMENTARY:  Patient has no signs of rash/itching, psoriasis, Raynaud's phenomenon or varicose veins. EMOTIONAL:  Patient has no signs of anxiety, depression, bipolar disorder, memory loss or change in mood. Vitals:  Date BP Pulse Temp (F) Resp. (per min.) Height (Total in.) Weight (lbs.) BMI   01/26/2021     73.00 235.00 31.00   10/02/2020     73.00  31.00     Physical Examination:  Examination of his left foot today shows he has good alignment of the forefoot. He has a Lachman tested at two, three, and four. Great toe has a healed surgical scar. Mild valgus alignment. He has no pain at the heel. Good alignment of the Achilles and plantar fascia. Radiograph Examination: Previous x-rays were reviewed from September which shows the previous hallux valgus surgery with a K-wire in place. Mild persistent valgus. Mild degenerative changes of the great toe. There is an articulation between the 1st and 2nd metatarsal bases. The 2nd and 3rd toes are relatively long. The 2nd toe has quite an amount of varus. Impression:  Left-sided forefoot metatarsalgia, plantar plate instability. Preoperative visit for upcoming left foot surgery.     Plan:  He is scheduled for a left-sided Weil osteotomy, plantar plate repair of the 2nd and 3rd toes. This will be done through a dorsal approach. He understands he will have screws in his toes to repair the fractures. He will be weightbearing on his heel. We are going to fit him with a short boot today for postoperative care. He should bring that to the hospital with him. We gave him a prescription for his Percocet for postop. He does not need postoperative antibiotics. He will get a block locally, and with Exparel and the local this should help a fair amount with the first day or two of pain. As far as returning to work, as soon as he is able to walk on his heel and get around and is able to do as he tolerates. He does need to elevate for the first few days. Remo Romberg Loralie Pluck, MD/ neal    CC Providers:  Moira Calvillo MD

## 2021-02-08 NOTE — ANESTHESIA PREPROCEDURE EVALUATION
Relevant Problems   No relevant active problems       Anesthetic History        Pertinent negatives: No PONV       Review of Systems / Medical History  Patient summary reviewed, nursing notes reviewed and pertinent labs reviewed    Pulmonary              Pertinent negatives: No asthma, sleep apnea and smoker     Neuro/Psych           Pertinent negatives: No seizures and CVA   Cardiovascular    Hypertension (took beta blocker this AM): well controlled          Hyperlipidemia    Exercise tolerance: >4 METS     GI/Hepatic/Renal             Pertinent negatives: No GERD, liver disease and renal disease  Comments: 4 beers/week Endo/Other        Obesity (BMI 33)  Pertinent negatives: No diabetes   Other Findings            Physical Exam    Airway  Mallampati: II  TM Distance: 4 - 6 cm  Neck ROM: normal range of motion   Mouth opening: Normal     Cardiovascular    Rhythm: regular  Rate: normal         Dental  No notable dental hx       Pulmonary  Breath sounds clear to auscultation               Abdominal  GI exam deferred       Other Findings            Anesthetic Plan    ASA: 2  Anesthesia type: general          Induction: Intravenous  Anesthetic plan and risks discussed with: Patient      Plan general anesthesia with possible single shot popliteal block to aid in post-op analgesia. Will discuss desire for block with surgeon. Patient acknowledges risks including rare risk of nerve damage and agrees with plan. Anesthesia consent reviewed and signed by patient. Patient given opportunity for questions about anesthesia consent. All questions answered.

## 2021-02-09 ENCOUNTER — ANESTHESIA (OUTPATIENT)
Dept: SURGERY | Age: 61
End: 2021-02-09
Payer: OTHER GOVERNMENT

## 2021-02-09 ENCOUNTER — HOSPITAL ENCOUNTER (OUTPATIENT)
Age: 61
Setting detail: OUTPATIENT SURGERY
Discharge: HOME | End: 2021-02-09
Attending: ORTHOPAEDIC SURGERY | Admitting: ORTHOPAEDIC SURGERY
Payer: OTHER GOVERNMENT

## 2021-02-09 VITALS
TEMPERATURE: 97.3 F | HEART RATE: 51 BPM | HEIGHT: 73 IN | WEIGHT: 251.06 LBS | SYSTOLIC BLOOD PRESSURE: 134 MMHG | OXYGEN SATURATION: 99 % | RESPIRATION RATE: 16 BRPM | DIASTOLIC BLOOD PRESSURE: 79 MMHG | BODY MASS INDEX: 33.27 KG/M2

## 2021-02-09 DIAGNOSIS — M77.42 METATARSALGIA, LEFT FOOT: Primary | ICD-10-CM

## 2021-02-09 PROCEDURE — 77030040361 HC SLV COMPR DVT MDII -B: Performed by: ORTHOPAEDIC SURGERY

## 2021-02-09 PROCEDURE — 74011250636 HC RX REV CODE- 250/636: Performed by: ANESTHESIOLOGY

## 2021-02-09 PROCEDURE — 76210000021 HC REC RM PH II 0.5 TO 1 HR: Performed by: ORTHOPAEDIC SURGERY

## 2021-02-09 PROCEDURE — 74011250637 HC RX REV CODE- 250/637: Performed by: ANESTHESIOLOGY

## 2021-02-09 PROCEDURE — 74011000250 HC RX REV CODE- 250: Performed by: ORTHOPAEDIC SURGERY

## 2021-02-09 PROCEDURE — 77030020268 HC MISC GENERAL SUPPLY: Performed by: ORTHOPAEDIC SURGERY

## 2021-02-09 PROCEDURE — 74011250636 HC RX REV CODE- 250/636: Performed by: NURSE ANESTHETIST, CERTIFIED REGISTERED

## 2021-02-09 PROCEDURE — 76942 ECHO GUIDE FOR BIOPSY: CPT | Performed by: ORTHOPAEDIC SURGERY

## 2021-02-09 PROCEDURE — C9290 INJ, BUPIVACAINE LIPOSOME: HCPCS | Performed by: ORTHOPAEDIC SURGERY

## 2021-02-09 PROCEDURE — 77010033678 HC OXYGEN DAILY

## 2021-02-09 PROCEDURE — C1713 ANCHOR/SCREW BN/BN,TIS/BN: HCPCS | Performed by: ORTHOPAEDIC SURGERY

## 2021-02-09 PROCEDURE — 77030020782 HC GWN BAIR PAWS FLX 3M -B: Performed by: ORTHOPAEDIC SURGERY

## 2021-02-09 PROCEDURE — 74011000250 HC RX REV CODE- 250: Performed by: NURSE ANESTHETIST, CERTIFIED REGISTERED

## 2021-02-09 PROCEDURE — 76060000034 HC ANESTHESIA 1.5 TO 2 HR: Performed by: ORTHOPAEDIC SURGERY

## 2021-02-09 PROCEDURE — 77030002912 HC SUT ETHBND J&J -A: Performed by: ORTHOPAEDIC SURGERY

## 2021-02-09 PROCEDURE — 77030006788 HC BLD SAW OSC STRY -B: Performed by: ORTHOPAEDIC SURGERY

## 2021-02-09 PROCEDURE — 74011250636 HC RX REV CODE- 250/636: Performed by: ORTHOPAEDIC SURGERY

## 2021-02-09 PROCEDURE — 77030031139 HC SUT VCRL2 J&J -A: Performed by: ORTHOPAEDIC SURGERY

## 2021-02-09 PROCEDURE — 2709999900 HC NON-CHARGEABLE SUPPLY: Performed by: ORTHOPAEDIC SURGERY

## 2021-02-09 PROCEDURE — 77030000032 HC CUF TRNQT ZIMM -B: Performed by: ORTHOPAEDIC SURGERY

## 2021-02-09 PROCEDURE — C1769 GUIDE WIRE: HCPCS | Performed by: ORTHOPAEDIC SURGERY

## 2021-02-09 PROCEDURE — 77030020743 HC CAP PROTCT PIN BATR -A: Performed by: ORTHOPAEDIC SURGERY

## 2021-02-09 PROCEDURE — 77030012508 HC MSK AIRWY LMA AMBU -A: Performed by: ANESTHESIOLOGY

## 2021-02-09 PROCEDURE — 76010000153 HC OR TIME 1.5 TO 2 HR: Performed by: ORTHOPAEDIC SURGERY

## 2021-02-09 PROCEDURE — 76210000006 HC OR PH I REC 0.5 TO 1 HR: Performed by: ORTHOPAEDIC SURGERY

## 2021-02-09 DEVICE — IMPLANTABLE DEVICE: Type: IMPLANTABLE DEVICE | Site: FOOT | Status: FUNCTIONAL

## 2021-02-09 DEVICE — SYSTEM IMPL W/ MINI SCORPION DX NDL MIC SUTLASSOS FIBERWIRE: Type: IMPLANTABLE DEVICE | Site: FOOT | Status: FUNCTIONAL

## 2021-02-09 RX ORDER — SODIUM CHLORIDE 0.9 % (FLUSH) 0.9 %
5-40 SYRINGE (ML) INJECTION AS NEEDED
Status: DISCONTINUED | OUTPATIENT
Start: 2021-02-09 | End: 2021-02-09 | Stop reason: HOSPADM

## 2021-02-09 RX ORDER — CEFAZOLIN SODIUM/WATER 2 G/20 ML
2 SYRINGE (ML) INTRAVENOUS ONCE
Status: COMPLETED | OUTPATIENT
Start: 2021-02-09 | End: 2021-02-09

## 2021-02-09 RX ORDER — ACETAMINOPHEN 500 MG
1000 TABLET ORAL ONCE
Status: COMPLETED | OUTPATIENT
Start: 2021-02-09 | End: 2021-02-09

## 2021-02-09 RX ORDER — FLUMAZENIL 0.1 MG/ML
0.2 INJECTION INTRAVENOUS
Status: DISCONTINUED | OUTPATIENT
Start: 2021-02-09 | End: 2021-02-09 | Stop reason: HOSPADM

## 2021-02-09 RX ORDER — SODIUM CHLORIDE, SODIUM LACTATE, POTASSIUM CHLORIDE, CALCIUM CHLORIDE 600; 310; 30; 20 MG/100ML; MG/100ML; MG/100ML; MG/100ML
125 INJECTION, SOLUTION INTRAVENOUS CONTINUOUS
Status: DISCONTINUED | OUTPATIENT
Start: 2021-02-09 | End: 2021-02-09 | Stop reason: HOSPADM

## 2021-02-09 RX ORDER — PROPOFOL 10 MG/ML
INJECTION, EMULSION INTRAVENOUS AS NEEDED
Status: DISCONTINUED | OUTPATIENT
Start: 2021-02-09 | End: 2021-02-09 | Stop reason: HOSPADM

## 2021-02-09 RX ORDER — ONDANSETRON 2 MG/ML
INJECTION INTRAMUSCULAR; INTRAVENOUS AS NEEDED
Status: DISCONTINUED | OUTPATIENT
Start: 2021-02-09 | End: 2021-02-09 | Stop reason: HOSPADM

## 2021-02-09 RX ORDER — OXYCODONE AND ACETAMINOPHEN 5; 325 MG/1; MG/1
1 TABLET ORAL
Qty: 20 TAB | Refills: 0 | Status: SHIPPED | OUTPATIENT
Start: 2021-02-09 | End: 2021-02-12

## 2021-02-09 RX ORDER — ROPIVACAINE HYDROCHLORIDE 5 MG/ML
INJECTION, SOLUTION EPIDURAL; INFILTRATION; PERINEURAL
Status: COMPLETED | OUTPATIENT
Start: 2021-02-09 | End: 2021-02-09

## 2021-02-09 RX ORDER — FENTANYL CITRATE 50 UG/ML
50 INJECTION, SOLUTION INTRAMUSCULAR; INTRAVENOUS AS NEEDED
Status: DISCONTINUED | OUTPATIENT
Start: 2021-02-09 | End: 2021-02-09 | Stop reason: HOSPADM

## 2021-02-09 RX ORDER — OXYCODONE HYDROCHLORIDE 5 MG/1
5 TABLET ORAL
Status: DISCONTINUED | OUTPATIENT
Start: 2021-02-09 | End: 2021-02-09 | Stop reason: HOSPADM

## 2021-02-09 RX ORDER — MIDAZOLAM HYDROCHLORIDE 1 MG/ML
INJECTION, SOLUTION INTRAMUSCULAR; INTRAVENOUS AS NEEDED
Status: DISCONTINUED | OUTPATIENT
Start: 2021-02-09 | End: 2021-02-09 | Stop reason: HOSPADM

## 2021-02-09 RX ORDER — LIDOCAINE HYDROCHLORIDE 20 MG/ML
INJECTION, SOLUTION EPIDURAL; INFILTRATION; INTRACAUDAL; PERINEURAL AS NEEDED
Status: DISCONTINUED | OUTPATIENT
Start: 2021-02-09 | End: 2021-02-09 | Stop reason: HOSPADM

## 2021-02-09 RX ORDER — FENTANYL CITRATE 50 UG/ML
INJECTION, SOLUTION INTRAMUSCULAR; INTRAVENOUS AS NEEDED
Status: DISCONTINUED | OUTPATIENT
Start: 2021-02-09 | End: 2021-02-09 | Stop reason: HOSPADM

## 2021-02-09 RX ORDER — DEXAMETHASONE SODIUM PHOSPHATE 4 MG/ML
INJECTION, SOLUTION INTRA-ARTICULAR; INTRALESIONAL; INTRAMUSCULAR; INTRAVENOUS; SOFT TISSUE AS NEEDED
Status: DISCONTINUED | OUTPATIENT
Start: 2021-02-09 | End: 2021-02-09 | Stop reason: HOSPADM

## 2021-02-09 RX ORDER — NALOXONE HYDROCHLORIDE 0.4 MG/ML
0.4 INJECTION, SOLUTION INTRAMUSCULAR; INTRAVENOUS; SUBCUTANEOUS AS NEEDED
Status: DISCONTINUED | OUTPATIENT
Start: 2021-02-09 | End: 2021-02-09 | Stop reason: HOSPADM

## 2021-02-09 RX ORDER — SODIUM CHLORIDE 0.9 % (FLUSH) 0.9 %
5-40 SYRINGE (ML) INJECTION EVERY 8 HOURS
Status: DISCONTINUED | OUTPATIENT
Start: 2021-02-09 | End: 2021-02-09 | Stop reason: HOSPADM

## 2021-02-09 RX ORDER — HYDROMORPHONE HYDROCHLORIDE 1 MG/ML
0.5 INJECTION, SOLUTION INTRAMUSCULAR; INTRAVENOUS; SUBCUTANEOUS
Status: DISCONTINUED | OUTPATIENT
Start: 2021-02-09 | End: 2021-02-09 | Stop reason: HOSPADM

## 2021-02-09 RX ADMIN — Medication 2 G: at 12:30

## 2021-02-09 RX ADMIN — MEPIVACAINE HYDROCHLORIDE 15 ML: 15 INJECTION, SOLUTION EPIDURAL; INFILTRATION at 11:40

## 2021-02-09 RX ADMIN — ACETAMINOPHEN 1000 MG: 500 TABLET ORAL at 10:32

## 2021-02-09 RX ADMIN — LIDOCAINE HYDROCHLORIDE 60 MG: 20 INJECTION, SOLUTION EPIDURAL; INFILTRATION; INTRACAUDAL; PERINEURAL at 12:22

## 2021-02-09 RX ADMIN — PROPOFOL 180 MG: 10 INJECTION, EMULSION INTRAVENOUS at 12:22

## 2021-02-09 RX ADMIN — MIDAZOLAM 2 MG: 1 INJECTION INTRAMUSCULAR; INTRAVENOUS at 12:17

## 2021-02-09 RX ADMIN — FENTANYL CITRATE 25 MCG: 50 INJECTION, SOLUTION INTRAMUSCULAR; INTRAVENOUS at 12:35

## 2021-02-09 RX ADMIN — DEXAMETHASONE SODIUM PHOSPHATE 4 MG: 4 INJECTION, SOLUTION INTRAMUSCULAR; INTRAVENOUS at 12:29

## 2021-02-09 RX ADMIN — ONDANSETRON HYDROCHLORIDE 4 MG: 2 INJECTION INTRAMUSCULAR; INTRAVENOUS at 12:29

## 2021-02-09 RX ADMIN — ROPIVACAINE HYDROCHLORIDE 10 ML: 5 INJECTION, SOLUTION EPIDURAL; INFILTRATION; PERINEURAL at 11:40

## 2021-02-09 RX ADMIN — SODIUM CHLORIDE, SODIUM LACTATE, POTASSIUM CHLORIDE, AND CALCIUM CHLORIDE: 600; 310; 30; 20 INJECTION, SOLUTION INTRAVENOUS at 13:20

## 2021-02-09 RX ADMIN — SODIUM CHLORIDE, SODIUM LACTATE, POTASSIUM CHLORIDE, AND CALCIUM CHLORIDE 125 ML/HR: 600; 310; 30; 20 INJECTION, SOLUTION INTRAVENOUS at 10:32

## 2021-02-09 NOTE — PERIOP NOTES
Reviewed PTA medication list with patient/caregiver and patient/caregiver denies any additional medications. Patient admits to having a responsible adult care for them at home for at least 24 hours after surgery. Patient encouraged to use gown warming system and informed that using said warming gown to regulate body temperature prior to a procedure has been shown to help reduce the risks of blood clots and infection. Patient's pharmacy of choice verified and documented in PTA medication section. Dual skin assessment & fall risk band verification completed with Felice Yousif.

## 2021-02-09 NOTE — BRIEF OP NOTE
Brief Postoperative Note    Patient: Hesham Levin  YOB: 1960  MRN: [id number]    Date of Procedure: 2/9/2021     Pre-Op Diagnosis: LEFT FOOT METATARSALGIA    Post-Op Diagnosis: Same as preoperative diagnosis. Procedure(s):  LEFT FOOT METATARSAL WEIL OSTEOTOMIES, PLANTAR REPAIRS, 2ND HAMMER TOE WITH MINI C-ARM    Surgeon(s):  Ella Ramirez MD    Surgical Assistant: Surg Asst-1: Barb Kothari    Anesthesia: General     Estimated Blood Loss (mL): less than 931     Complications: None    Specimens: * No specimens in log *     Implants:   Implant Name Type Inv. Item Serial No.  Lot No. LRB No. Used Action   SYSTEM IMPL W/ MINI SCORPION DX NDL JOSHUA Melanee Pica - [id number]  SYSTEM IMPL W/ MINI SCORPION DX NDL JOSHUA SUTLASSOS [employer] [id number] Left 1 Implanted   SCREW BONE L14MM DIA2. 3MM LINK TI SLD FULL THRD LO PROF - [id number]  SCREW BONE L14MM DIA2. 3MM LINK TI SLD FULL THRD LO PROF  [employer] 1 Left 2 Implanted   SCREW BNE L16MM DIA2. 3MM LINK TI SLD FULL THRD LO PROF - [id number]  SCREW BNE L16MM DIA2. 3MM LINK TI SLD FULL THRD LO PROF  [employer] 1 Left 2 Implanted       Drains: * No LDAs found *    Findings: tight extensor, fixed PIP second, torn plantar plate    Electronically Signed by Shanta Gayle MD on 2/9/2021 at 2:21 PM

## 2021-02-09 NOTE — ANESTHESIA PROCEDURE NOTES
Peripheral Block    Start time: 2/9/2021 12:31 PM  End time: 2/9/2021 12:39 PM  Performed by: Trini Marie MD  Authorized by: Trini Marie MD       Pre-procedure:    Indications: at surgeon's request and post-op pain management    Preanesthetic Checklist: patient identified, risks and benefits discussed, site marked, timeout performed, anesthesia consent given and patient being monitored    Timeout Time: 12:31          Block Type:   Block Type:  Popliteal  Laterality:  Left  Monitoring:  Standard ASA monitoring, continuous pulse ox, frequent vital sign checks, heart rate, responsive to questions and oxygen  Injection Technique:  Single shot  Procedures: ultrasound guided    Patient Position: supine  Prep: chlorhexidine    Needle Type:  Stimuplex  Needle Gauge:  21 G  Needle Localization:  Anatomical landmarks  Medication Injected:  Ropivacaine (PF) (NAROPIN)(0.5%) 5 mg/mL injection, 10 mL  mepivacaine PF (CARBOCAINE) 1.5 % injection, 15 mL  Med Admin Time: 2/9/2021 11:40 AM    Assessment:  Number of attempts:  1  Injection Assessment:  Incremental injection every 5 mL, local visualized surrounding nerve on ultrasound, negative aspiration for blood, no paresthesia, no intravascular symptoms and ultrasound image on chart  Patient tolerance:  Patient tolerated the procedure well with no immediate complications

## 2021-02-09 NOTE — INTERVAL H&P NOTE
Update History & Physical 
 
The Patient's History and Physical was reviewed with the patient and I examined the patient. There was no change. The surgical site was confirmed by the patient and me. Plan:  The risk, benefits, expected outcome, and alternative to the recommended procedure have been discussed with the patient. Patient understands and wants to proceed with the procedure.  
 
Electronically signed by Bari Allen MD on 2/9/2021 at 12:07 PM

## 2021-02-09 NOTE — PERIOP NOTES
Discharge instructions completed via phone call to Alena Robertson- opportunity for questions - AVS med list reviewed for duplicates

## 2021-02-09 NOTE — DISCHARGE INSTRUCTIONS
Follow all instructions from Dr. Muna Saunders  Take prescriptions as directed  Weight bearing on heel only for operative foot with Post op shoe  Keep surgical dressing clean , dry and intact until office appointment  Follow up with Dr. Muna Saunders on 2/24/21 at 9:00 am    Contact Dr. Chhaya Lewis office with any Post op questions or concerns      DISCHARGE SUMMARY from Nurse    PATIENT INSTRUCTIONS:    After general anesthesia or intravenous sedation, for 24 hours or while taking prescription Narcotics:  · Limit your activities  · Do not drive and operate hazardous machinery  · Do not make important personal or business decisions  · Do  not drink alcoholic beverages  · If you have not urinated within 8 hours after discharge, please contact your surgeon on call. Report the following to your surgeon:  · Excessive pain, swelling, redness or odor of or around the surgical area  · Temperature over 100.5  · Nausea and vomiting lasting longer than 4 hours or if unable to take medications  · Any signs of decreased circulation or nerve impairment to extremity: change in color, persistent  numbness, tingling, coldness or increase pain  · Any questions    What to do at Home:  Recommended activity: Ambulate in house and No lifting, Driving, or Strenuous exercise until advised by Dr. Muna Saunders,     If you experience any of the following symptoms fever, chills, uncontrollable pain , active bleeding or drainage , circulation changes ( cold, numb, blue extremity ), please follow up with Dr. Muna Saunders. *  Please give a list of your current medications to your Primary Care Provider. *  Please update this list whenever your medications are discontinued, doses are      changed, or new medications (including over-the-counter products) are added. *  Please carry medication information at all times in case of emergency situations.     These are general instructions for a healthy lifestyle:    No smoking/ No tobacco products/ Avoid exposure to second hand smoke  Surgeon General's Warning:  Quitting smoking now greatly reduces serious risk to your health. Obesity, smoking, and sedentary lifestyle greatly increases your risk for illness    A healthy diet, regular physical exercise & weight monitoring are important for maintaining a healthy lifestyle    You may be retaining fluid if you have a history of heart failure or if you experience any of the following symptoms:  Weight gain of 3 pounds or more overnight or 5 pounds in a week, increased swelling in our hands or feet or shortness of breath while lying flat in bed. Please call your doctor as soon as you notice any of these symptoms; do not wait until your next office visit. Patient armband removed and shredded    The discharge information has been reviewed with the patient and caregiver. The patient and caregiver verbalized understanding. Discharge medications reviewed with the patient and caregiver and appropriate educational materials and side effects teaching were provided. Learning About Coronavirus [phone])  Coronavirus [phone]): Overview  What is coronavirus (COVID-19)? The coronavirus disease (COVID-19) is caused by a virus. It is an illness that was first found in [place], [place], in December 2019. It has since spread worldwide. The virus can cause fever, cough, and trouble breathing. In severe cases, it can cause pneumonia and make it hard to breathe without help. It can cause death. Coronaviruses are a large group of viruses. They cause the common cold. They also cause more serious illnesses like Middle East respiratory syndrome (MERS) and severe acute respiratory syndrome (SARS). COVID-19 is caused by a novel coronavirus. That means it's a new type that has not been seen in people before. This virus spreads person-to-person through droplets from coughing and sneezing. It can also spread when you are close to someone who is infected.  And it can spread when you touch something that has the virus on it, such as a doorknob or a tabletop. What can you do to protect yourself from coronavirus (COVID-19)? The best way to protect yourself from getting sick is to:  · Avoid areas where there is an outbreak. · Avoid contact with people who may be infected. · Wash your hands often with soap or alcohol-based hand sanitizers. · Avoid crowds and try to stay at least 6 feet away from other people. · Wash your hands often, especially after you cough or sneeze. Use soap and water, and scrub for at least 20 seconds. If soap and water aren't available, use an alcohol-based hand . · Avoid touching your mouth, nose, and eyes. What can you do to avoid spreading the virus to others? To help avoid spreading the virus to others:  · Cover your mouth with a tissue when you cough or sneeze. Then throw the tissue in the trash. · Use a disinfectant to clean things that you touch often. · Stay home if you are sick or have been exposed to the virus. Don't go to school, work, or public areas. And don't use public transportation. · If you are sick:  ? Leave your home only if you need to get medical care. But call the doctor's office first so they know you're coming. And wear a face mask, if you have one.  ? If you have a face mask, wear it whenever you're around other people. It can help stop the spread of the virus when you cough or sneeze. ? Clean and disinfect your home every day. Use household  and disinfectant wipes or sprays. Take special care to clean things that you grab with your hands. These include doorknobs, remote controls, phones, and handles on your refrigerator and microwave. And don't forget countertops, tabletops, bathrooms, and computer keyboards. When to call for help  Call 911 anytime you think you may need emergency care. For example, call if:  · You have severe trouble breathing. (You can't talk at all.)  · You have constant chest pain or pressure.   · You are severely dizzy or lightheaded. · You are confused or can't think clearly. · Your face and lips have a blue color. · You pass out (lose consciousness) or are very hard to wake up. Call your doctor now if you develop symptoms such as:  · Shortness of breath. · Fever. · Cough. If you need to get care, call ahead to the doctor's office for instructions before you go. Make sure you wear a face mask, if you have one, to prevent exposing other people to the virus. Where can you get the latest information? The following health organizations are tracking and studying this virus. Their websites contain the most up-to-date information. [place] also learn what to do if you think you may have been exposed to the virus. · U.S. Centers for Disease Control and Prevention (CDC): The CDC provides updated news about the disease and travel advice. The website also tells you how to prevent the spread of infection. www.cdc.gov  · World Health Organization [facility]): WHO offers information about the virus outbreaks. WHO also has travel advice. www.who.int  Current as of: April 1, 2020               Content Version: 12.4  © 9174-1803 [employer]. Care instructions adapted under license by your healthcare professional. If you have questions about a medical condition or this instruction, always ask your healthcare professional. [address] any warranty or liability for your use of this information.     ___________________________________________________________________________________________________________________________________

## 2021-02-10 NOTE — ANESTHESIA POSTPROCEDURE EVALUATION
Post-Anesthesia Evaluation and Assessment    Cardiovascular Function/Vital Signs  Visit Vitals  /79   Pulse (!) 51   Temp 36.3 °C (97.3 °F)   Resp 16   Ht 6' 1\" (1.854 m)   Wt 113.9 kg (251 lb 1 oz)   SpO2 99%   BMI 33.12 kg/m²       Patient is status post Procedure(s):  LEFT FOOT METATARSAL WEIL OSTEOTOMIES, PLANTAR REPAIRS, 2ND HAMMER TOE WITH MINI C-ARM. Nausea/Vomiting: Controlled. Postoperative hydration reviewed and adequate. Pain:  Pain Scale 1: Numeric (0 - 10) (02/09/21 1545)  Pain Intensity 1: 0 (02/09/21 1545)   Managed. Neurological Status:   Neuro (WDL): Exceptions to WDL (02/09/21 1545)   At baseline. Mental Status and Level of Consciousness: Arousable. Pulmonary Status:   O2 Device: Room air (02/09/21 1545)   Adequate oxygenation and airway patent. Complications related to anesthesia: None    Post-anesthesia assessment completed. No concerns. Patient has met all discharge requirements.     Signed By: Tresa Broderick MD    February 9, 2021

## 2021-02-10 NOTE — OP NOTES
[facility]  OPERATIVE REPORT    Name:  Iris Mercedes  MR#:   [id number]  :  1960  ACCOUNT #:  [de-identified]  DATE OF SERVICE:  2021    PREOPERATIVE DIAGNOSES:  Left foot metatarsalgia, status post hallux valgus surgery. POSTOPERATIVE DIAGNOSES:  Left foot metatarsalgia, status post hallux valgus surgery with hammertoe of the second toe, plantar plate insufficiency of the second. PROCEDURES PERFORMED:  Left second metatarsal Weil osteotomy, left plantar plate repair of the second metatarsophalangeal joint, left capsulotomy with extensor tendon lengthening of the second toe, and PIP joint resection arthroplasty of the second toe, third metatarsal Weil osteotomy, third plantar plate repair. SURGEON:  Preet Galvez MD.    ASSISTANT:  Tracy Briscoe. ANESTHESIA:  General plus popliteal block. COMPLICATIONS:  none. SPECIMENS REMOVED:  None. IMPLANTS:  Arthrex Mini Scorpion tray with SutureTape and 2.3 screws. ESTIMATED BLOOD LOSS:  Less than 100. INDICATIONS:  The patient has had longstanding issues with other orthopedic issues including a diskectomy, a fusion, finger issues, and a bunionectomy. He has been complaining of pain in the forefoot, which we treated with metatarsal padding and activity modification. His first ray was relatively short compared to two and three, and due to the insufficiency, we tried taping and conservative measures, which he failed. X-ray findings were reviewed. We discussed the risks, benefits, and alternatives. With these findings of the long metatarsal pain and the metatarsal head swelling, feeling of sharp pain, as well as now developing a fixed contracture of the PIP joint, we discussed plantar plate repair and tendon lengthening if needed as well as repair of the plantar plate. PROCEDURE:  He was marked preoperatively. He underwent a block by Anesthesia.   After marking, he was taken to the operating room, underwent general anesthesia, supine on the operating table, padded appropriately. The time-out showed the correct limb and correct patient. After reviewing the x-rays, time-out, and indications, we then made a dorsal incision in the second webspace sharply down through skin, freeing up soft tissue. There were large veins over the top of this, which we cauterized where we needed to and then opened up the second metatarsophalangeal joint. Extensor tendons were quite tight. We opened up the capsule and found there to be some wear at the dorsal aspect of the metatarsal head. I did not see a lot of cartilage damage in the second metatarsal head, but there was obvious tear at the plantar plate and instability of the metatarsophalangeal joint. We freed up the medial and lateral sides of the joint, used the McGlamry underneath the metatarsal head. We used a Weil osteotomy from dorsal distal to plantar proximal and pushed the head back about a centimeter, pinned this provisionally with a 0.062 K-wire. We then checked the plantar plate and there did appear to be some scuffing along the inferior aspect of the proximal phalanx as well as some stretching out of the plantar plate. We then tried releasing the capsule off the plantar aspect of the proximal phalanx and then placed sutures through this, placed drill tunnels through the proximal phalanx to repair this back down at the end of the case. We found the toe to be still quite tight with extensor tendons. We then performed a Z-lengthening of the extensor digitorum longus and brevis, and found that the contracture of the extensor tendon helped the metatarsophalangeal joint, but did not correct the PIP joint contracture. We tried a closed osteoclasis of the PIP joint and then after this, we went to the third toe and performed a Weil osteotomy of the third metatarsal shaft.   We had over shortening of the metatarsal originally, taking the plantar plate off which obviously had a tear and the capsule was not in very good condition. We passed sutures and added sort of a weave pattern to the plantar plate to get good purchase. After this, we passed this from plantar-to-dorsal using 0.062 K-wires to pull this up and then reduced the metatarsal cascade and fixed this with 2.3 noncannulated screws at the second and third metatarsal shafts. We did have to adjust the screw position at the second to take the toe out of a little bit of varus and we tightened the screw. So, we had good cascade, good alignment, good fixation, stable. We then reduced the metatarsophalangeal joint and still had PIP joint contracture; therefore, we ellipsed out the skin over the PIP joint of the second toe, taking this down through skin and tendon. We freed this up and then freed up the distal aspect of the proximal phalanx with a 15-blade scalpel. We excised about the distal 3 mm of the proximal phalanx and then placed a 0.045 K-wire out the tip of the toe down the proximal phalanx and then reduced the second metatarsophalangeal joint and pinned the wire across this. We did hit the screws, but then went across and had good purchase. The joint appeared to be congruently reduced and centered on the C-arm pictures. We washed this out. We repaired the capsule with the FiberTape over the top and then after repairing the FiberTape and with K-wire placed, we then repaired the extensor tendons over the second metatarsophalangeal joint. We washed the wounds out copiously with normal saline and Betadine. We closed the PIP joint resection arthroplasty with interrupted vertical mattress nylon and simple nylons. We closed the second webspace incision with Vicryl overtop of the screws and then Vicryl for skin and then nylon. We injected 20 mL of 0.5% Marcaine plain along the incision. Soft dressing was applied and then into a postoperative shoe. Heel weightbearing, and to go home.       Chi Sinclair Murtaza Lawler MD      [id number]  D:  02/09/2021 14:35  T:  02/10/2021 0:37  JOB #:  [id number]

## (undated) DEVICE — 3M™ TEGADERM™ TRANSPARENT FILM DRESSING FRAME STYLE, 1626W, 4 IN X 4-3/4 IN (10 CM X 12 CM), 50/CT 4CT/CASE: Brand: 3M™ TEGADERM™

## (undated) DEVICE — DRSG GZ XEROFORM CUR 4X4 --

## (undated) DEVICE — CAP PROTCT PIN 0.045INX0.89MM --

## (undated) DEVICE — BANDAGE COMPR W4INXL10YD WHITE/BEIGE E MTRX HK LOOP CLSR

## (undated) DEVICE — GUIDEWIRE ORTH L150MM DIA1MM W/ TRCR TIP

## (undated) DEVICE — NDL SPNE QNCKE 18GX3.5IN LF --

## (undated) DEVICE — PREP SKN PREVAIL 40ML APPL --

## (undated) DEVICE — TOWEL,OR,DSP,ST,BLUE,STD,4/PK,20PK/CS: Brand: MEDLINE

## (undated) DEVICE — PACK PROCEDURE SURG LAMINECTOMY SPINE CUST

## (undated) DEVICE — DRAPE EQUIP CARM MINI STRL

## (undated) DEVICE — K-WIRE DOUBLE TROCAR SMOOTH

## (undated) DEVICE — 3M™ IOBAN™ 2 ANTIMICROBIAL INCISE DRAPE 6650EZ: Brand: IOBAN™ 2

## (undated) DEVICE — GARMENT,MEDLINE,DVT,INT,CALF,MED, GEN2: Brand: MEDLINE

## (undated) DEVICE — PACK PROCEDURE SURG EXTREMITY CUST

## (undated) DEVICE — SUTURE PROL SZ 3-0 L18IN NONABSORBABLE BLU L19MM PC-5 3/8 8632G

## (undated) DEVICE — SPONGE LAP 18X18IN STRL -- 5/PK

## (undated) DEVICE — ROUND DISSECTORS: Brand: DEROYAL

## (undated) DEVICE — SUTURE ETHIB EXCL BR GRN TAPR PT 2-0 30 X563H X563H

## (undated) DEVICE — SUT VCRL + 0 36IN UR6 VIO --

## (undated) DEVICE — PREP SKN CHLRAPRP APL 26ML STR --

## (undated) DEVICE — DERMABOND SKIN ADH 0.7ML -- DERMABOND ADVANCED 12/BX

## (undated) DEVICE — 1010 S-DRAPE TOWEL DRAPE 10/BX: Brand: STERI-DRAPE™

## (undated) DEVICE — ZIMMER® STERILE DISPOSABLE TOURNIQUET CUFF WITH PROTECTIVE SLEEVE AND PLC, SINGLE PORT, SINGLE BLADDER, 18 IN. (46 CM)

## (undated) DEVICE — STERILE POLYISOPRENE POWDER-FREE SURGICAL GLOVES: Brand: PROTEXIS

## (undated) DEVICE — NEEDLE HYPO 25GA L1.5IN BLU POLYPR HUB S STL REG BVL STR

## (undated) DEVICE — PRECISION (9.0 X 0.51 X 25.0MM)

## (undated) DEVICE — Z DISCONTINUED USE (MFG CAT 7984-37) SOLUTION IV SODIUM CHL 0.9% 100 ML INJ

## (undated) DEVICE — PADDING CAST W4INXL4YD ST COT COHESIVE HND TEARABLE SPEC

## (undated) DEVICE — Device

## (undated) DEVICE — SUTURE VCRL + SZ 2-0 L18IN ABSRB VLT CT-2 1/2 CIR TAPERCUT VCP726D

## (undated) DEVICE — ZIMMER® STERILE DISPOSABLE TOURNIQUET CUFF WITH PLC, DUAL PORT, SINGLE BLADDER, 34 IN. (86 CM)

## (undated) DEVICE — BIPOLAR SEALER 23-314-1 AQM MINI EVS 3.4: Brand: AQUAMANTYS™

## (undated) DEVICE — 3-0 COATED VICRYL PLUS UNDYED 1X27" SH --

## (undated) DEVICE — SOL IRRIGATION INJ NACL 0.9% 500ML BTL